# Patient Record
Sex: MALE | Race: WHITE
[De-identification: names, ages, dates, MRNs, and addresses within clinical notes are randomized per-mention and may not be internally consistent; named-entity substitution may affect disease eponyms.]

---

## 2017-03-24 ENCOUNTER — HOSPITAL ENCOUNTER (OUTPATIENT)
Dept: HOSPITAL 62 - RAD | Age: 22
End: 2017-03-24
Attending: SURGERY
Payer: COMMERCIAL

## 2017-03-24 DIAGNOSIS — R11.2: ICD-10-CM

## 2017-03-24 DIAGNOSIS — R10.9: Primary | ICD-10-CM

## 2017-03-24 PROCEDURE — A9537 TC99M MEBROFENIN: HCPCS

## 2017-03-24 PROCEDURE — 78227 HEPATOBIL SYST IMAGE W/DRUG: CPT

## 2019-04-27 ENCOUNTER — HOSPITAL ENCOUNTER (OUTPATIENT)
Dept: HOSPITAL 62 - ER | Age: 24
Setting detail: OBSERVATION
LOS: 1 days | Discharge: HOME | End: 2019-04-28
Attending: SURGERY | Admitting: SURGERY
Payer: COMMERCIAL

## 2019-04-27 DIAGNOSIS — E66.9: ICD-10-CM

## 2019-04-27 DIAGNOSIS — K80.10: Primary | ICD-10-CM

## 2019-04-27 DIAGNOSIS — D13.5: ICD-10-CM

## 2019-04-27 DIAGNOSIS — L29.8: ICD-10-CM

## 2019-04-27 DIAGNOSIS — K76.0: ICD-10-CM

## 2019-04-27 LAB
ADD MANUAL DIFF: NO
ALBUMIN SERPL-MCNC: 4.7 G/DL (ref 3.5–5)
ALP SERPL-CCNC: 64 U/L (ref 38–126)
ALT SERPL-CCNC: 68 U/L (ref 21–72)
ANION GAP SERPL CALC-SCNC: 12 MMOL/L (ref 5–19)
APPEARANCE UR: (no result)
APTT PPP: (no result) S
AST SERPL-CCNC: 26 U/L (ref 17–59)
BASOPHILS # BLD AUTO: 0.1 10^3/UL (ref 0–0.2)
BASOPHILS NFR BLD AUTO: 0.4 % (ref 0–2)
BILIRUB DIRECT SERPL-MCNC: 0.3 MG/DL (ref 0–0.4)
BILIRUB SERPL-MCNC: 0.5 MG/DL (ref 0.2–1.3)
BILIRUB UR QL STRIP: (no result)
BUN SERPL-MCNC: 13 MG/DL (ref 7–20)
CALCIUM: 10.4 MG/DL (ref 8.4–10.2)
CHLORIDE SERPL-SCNC: 104 MMOL/L (ref 98–107)
CO2 SERPL-SCNC: 26 MMOL/L (ref 22–30)
EOSINOPHIL # BLD AUTO: 0.4 10^3/UL (ref 0–0.6)
EOSINOPHIL NFR BLD AUTO: 2.3 % (ref 0–6)
ERYTHROCYTE [DISTWIDTH] IN BLOOD BY AUTOMATED COUNT: 13.4 % (ref 11.5–14)
GLUCOSE SERPL-MCNC: 125 MG/DL (ref 75–110)
GLUCOSE UR STRIP-MCNC: NEGATIVE MG/DL
HCT VFR BLD CALC: 46.1 % (ref 37.9–51)
HGB BLD-MCNC: 15.9 G/DL (ref 13.5–17)
KETONES UR STRIP-MCNC: (no result) MG/DL
LIPASE SERPL-CCNC: 65.2 U/L (ref 23–300)
LYMPHOCYTES # BLD AUTO: 3.3 10^3/UL (ref 0.5–4.7)
LYMPHOCYTES NFR BLD AUTO: 20.8 % (ref 13–45)
MCH RBC QN AUTO: 29.1 PG (ref 27–33.4)
MCHC RBC AUTO-ENTMCNC: 34.4 G/DL (ref 32–36)
MCV RBC AUTO: 85 FL (ref 80–97)
MONOCYTES # BLD AUTO: 0.8 10^3/UL (ref 0.1–1.4)
MONOCYTES NFR BLD AUTO: 5.1 % (ref 3–13)
NEUTROPHILS # BLD AUTO: 11.5 10^3/UL (ref 1.7–8.2)
NEUTS SEG NFR BLD AUTO: 71.4 % (ref 42–78)
NITRITE UR QL STRIP: NEGATIVE
PH UR STRIP: 5 [PH] (ref 5–9)
PLATELET # BLD: 255 10^3/UL (ref 150–450)
POTASSIUM SERPL-SCNC: 4 MMOL/L (ref 3.6–5)
PROT SERPL-MCNC: 7.3 G/DL (ref 6.3–8.2)
PROT UR STRIP-MCNC: 30 MG/DL
RBC # BLD AUTO: 5.45 10^6/UL (ref 4.35–5.55)
SODIUM SERPL-SCNC: 142.4 MMOL/L (ref 137–145)
SP GR UR STRIP: 1.03
TOTAL CELLS COUNTED % (AUTO): 100 %
UROBILINOGEN UR-MCNC: 2 MG/DL (ref ?–2)
WBC # BLD AUTO: 16.1 10^3/UL (ref 4–10.5)

## 2019-04-27 PROCEDURE — 83690 ASSAY OF LIPASE: CPT

## 2019-04-27 PROCEDURE — 85025 COMPLETE CBC W/AUTO DIFF WBC: CPT

## 2019-04-27 PROCEDURE — 96361 HYDRATE IV INFUSION ADD-ON: CPT

## 2019-04-27 PROCEDURE — G0378 HOSPITAL OBSERVATION PER HR: HCPCS

## 2019-04-27 PROCEDURE — 99285 EMERGENCY DEPT VISIT HI MDM: CPT

## 2019-04-27 PROCEDURE — 0FT44ZZ RESECTION OF GALLBLADDER, PERCUTANEOUS ENDOSCOPIC APPROACH: ICD-10-PCS | Performed by: SURGERY

## 2019-04-27 PROCEDURE — 74177 CT ABD & PELVIS W/CONTRAST: CPT

## 2019-04-27 PROCEDURE — 47562 LAPAROSCOPIC CHOLECYSTECTOMY: CPT

## 2019-04-27 PROCEDURE — 96365 THER/PROPH/DIAG IV INF INIT: CPT

## 2019-04-27 PROCEDURE — S0028 INJECTION, FAMOTIDINE, 20 MG: HCPCS

## 2019-04-27 PROCEDURE — 81001 URINALYSIS AUTO W/SCOPE: CPT

## 2019-04-27 PROCEDURE — 36415 COLL VENOUS BLD VENIPUNCTURE: CPT

## 2019-04-27 PROCEDURE — 96366 THER/PROPH/DIAG IV INF ADDON: CPT

## 2019-04-27 PROCEDURE — 96375 TX/PRO/DX INJ NEW DRUG ADDON: CPT

## 2019-04-27 PROCEDURE — 88304 TISSUE EXAM BY PATHOLOGIST: CPT

## 2019-04-27 PROCEDURE — 80053 COMPREHEN METABOLIC PANEL: CPT

## 2019-04-27 PROCEDURE — 76705 ECHO EXAM OF ABDOMEN: CPT

## 2019-04-27 PROCEDURE — 96376 TX/PRO/DX INJ SAME DRUG ADON: CPT

## 2019-04-27 RX ADMIN — OXYCODONE AND ACETAMINOPHEN PRN TAB: 5; 325 TABLET ORAL at 22:48

## 2019-04-27 RX ADMIN — PIPERACILLIN AND TAZOBACTAM SCH MLS/HR: 4; .5 INJECTION, POWDER, LYOPHILIZED, FOR SOLUTION INTRAVENOUS; PARENTERAL at 21:14

## 2019-04-27 RX ADMIN — SODIUM CHLORIDE PRN MLS/HR: 9 INJECTION, SOLUTION INTRAVENOUS at 21:13

## 2019-04-27 RX ADMIN — MORPHINE SULFATE PRN MG: 10 INJECTION INTRAMUSCULAR; INTRAVENOUS; SUBCUTANEOUS at 21:06

## 2019-04-27 NOTE — RADIOLOGY REPORT (SQ)
EXAM DESCRIPTION:

US ABDOMEN LIMITED



COMPLETED DATE/TME:  04/27/2019 04:45



CLINICAL HISTORY:

23 years Male, EPIGASTRIC/RUQ PAIN



Comparison: December 20, 2016. Nuclear medicine, March 24, 2017.



LIMITATIONS: None.



FINDINGS:



Gallbladder sludge, 4 mm gallbladder wall thickness, negative

sonographic Brown's test,  no pericholecystic fluid, mild

hepatic steatosis, a 0.3-cm diameter common bile duct, no

intrahepatic ductal dilation, 11-cm right kidney, obscured

pancreas, visualized vasculature/abdominal aorta, and no

significant ascites appear otherwise unremarkable.



IMPRESSION:



1.  No acute findings.  

2.  Gallbladder sludge. 4 mm gallbladder wall thickness,

nonspecific.

3.  Hepatic steatosis.

4.  Obscured pancreas.

## 2019-04-27 NOTE — OPERATIVE REPORT E
Operative Report



NAME: MANISH ESCAMILLA

MRN:  [id number]          : 1995 AGE:  23Y

DATE OF SURGERY: 2019               ROOM: [id number]



PREOPERATIVE DIAGNOSES:

1.  ACUTE CHOLECYSTITIS.

2.  CHOLELITHIASIS.



POSTOPERATIVE DIAGNOSES:

1.  ACUTE CHOLECYSTITIS.

2.  CHOLELITHIASIS.



PROCEDURE:

Laparoscopic cholecystectomy.



SURGEON:

CHEYANNE LOZANO M.D.



ANESTHESIA:

General.



INDICATION:

This is a 23-year-old male complaining of abdominal pains around 9 this

morning, associated nausea and vomiting.  He went to the ED where an

ultrasound of the abdomen revealed gallstone.  He is markedly tender in

the right upper quadrant with elevated white count.



DESCRIPTION OF PROCEDURE:

After adequate general anesthesia the patient was placed in the supine

position and the abdomen prepped and draped in the usual sterile fashion. 

Appropriate timeout was then called.



Next, infraumbilical incision was made, fascia divided, and Kevin trocar

inserted through the fascia to the abdominal cavity, and CO2 insufflated

to a pressure of 15 mmHg.  Three other trocars were placed under direction

vision at 12 mm in the subxiphoid and two 5 mm in the right upper

quadrant.  The gallbladder was then noted to be markedly distended and

inflamed.  It was then punctured with a long needle and bile aspirated. 

We were now able to grasp the tip of the gallbladder and pull over the

liver.  The infundibulum was then grasped also and the cystic duct

dissected.  There was a lot of fatty tissue around the cystic duct.  This

was dissected with the use of [place] dissector and with Harmonic yuliana.

The cystic duct was then identified as well as the cystic artery.  The

cystic duct noted to be relatively small, it was then clipped with

hemoclips, divided between the distal 2 clips.  The cystic artery was then

clipped with hemoclips and divided within the 2 clips with Harmonic

yuliana.  The gallbladder was then taken off the liver bed with the use of

Harmonic yuliana.  The gallbladder noted to be markedly edematous. 

However, the mid part of the gallbladder appears to be markedly adhered to

the liver and the gallbladder was then partially removed by peeling it off

on pulling the gallbladder cephalad.



Next, prior to removal of the gallbladder from the liver bed, the liver

bed was then irrigated and hemostasis obtained with cautery.  The

gallbladder was then completely removed from the liver bed and placed in

an Endobag and pulled out through the umbilical port.  There appears to be

at least 1 large stone about 1.5 cm in diameter.  Following this the

Kevin trocar was then reinserted and the liver bed again inspected. 

There appears to be no active bleeding but minimal oozing.  Surgicel was

placed over the cystic duct area stump to aid in hemostasis.  A

Miguel-Mckenzie drain was then pulled out through the lateral right upper

quadrant port after placing it through the subxiphoid port.  It was then

laid over the liver bed and anchored to the skin with 2-0 Nylon.



Next, all the trocars were removed and CO2 allowed to come out through the

trocar sites.  The infraumbilical fascial defect was then closed with 2

figure-of-eight sutures using 0-Vicryl.  The stay suture that was placed

earlier was then tied together to have a better closure.



Next, the fascia of this area was then injected with Marcaine.  All the

subcu incision also injected with Marcaine.



Next, all the skin incisions were then closed with running subcuticular

4-0 Vicryl and tied.  Steri-Strips were placed over the operative sites. 

Needle, instrument, and sponge counts were all corrected.  Estimated blood

loss about 30 mL.  The patient brought to the recovery room, extubated, in

satisfactory condition.



DICTATING PHYSICIAN:  CHEYANNE LOZANO M.D.





[id number]                  DT: 2019

PHY#: [id number]            DD: 2019

ID:   [id number]           JOB#: [id number]       ACCT: [id number]



cc:CHEYANNE LOZANO M.D.

>

## 2019-04-27 NOTE — ER DOCUMENT REPORT
ED GI/





- General


Chief Complaint: Abdominal Pain


Stated Complaint: ABD PAIN


Time Seen by Provider: 04/27/19 04:28


TRAVEL OUTSIDE OF THE [place] IN LAST 30 DAYS: No





- HPI


Notes: 





04/27/19 04:35


Patient presents to the emergency department with chief complaint of abdominal 

pain that started around 1 AM this morning.  Patient states having "gallbladder 

issues" in the past and states that the gallbladder attacks have never been this

bad.  Patient has vomited multiple times since developing pain.  He reports that

the pain feels like a burning throughout his whole abdomen worse in the 

epigastric and right upper quadrant.  Patient states the last time he ate 

anything was 6 PM last night.


04/27/19 04:55








- Related Data


Allergies/Adverse Reactions: 


                                        





No Known Allergies Allergy (Unverified 12/31/15 06:42)


   











Past Medical History





- Social History


Smoking Status: Unknown if Ever Smoked


Family History: Reviewed & Not Pertinent





- Immunizations


Hx Diphtheria, Pertussis, Tetanus Vaccination: Yes





Review of Systems





- Review of Systems


Constitutional: See HPI


EENT: No symptoms reported


Cardiovascular: No symptoms reported


Respiratory: No symptoms reported


Gastrointestinal: See HPI


Genitourinary: No symptoms reported


Male Genitourinary: No symptoms reported


Musculoskeletal: No symptoms reported


Skin: No symptoms reported


Hematologic/Lymphatic: No symptoms reported


Neurological/Psychological: No symptoms reported





Physical Exam





- Vital signs


Vitals: 


                                        











Temp Pulse Resp BP Pulse Ox


 


 97.9 F   93   16   147/98 H  98 


 


 04/27/19 04:20  04/27/19 04:20  04/27/19 04:20  04/27/19 04:20  04/27/19 04:20











Interpretation: Hypertensive





- Notes


Notes: 





GENERAL: Ill-appearing, obvious distress. Patient writhing in pain. Unable to 

find position of comfort.


HEAD: Atraumatic, normocephalic.


EYES: Pupils equal round and reactive to light, extraocular movements intact, 

sclera anicteric, conjunctiva are normal.


ENT: TMs normal, nares patent, oropharynx clear without exudates.  Moist mucous 

membranes.


NECK: Normal range of motion, supple without lymphadenopathy or JVD.


LUNGS: Breath sounds clear to auscultation bilaterally and equal.  No wheezes 

rales or rhonchi.


HEART: Regular rate and rhythm without murmurs, rubs or gallops.


ABDOMEN: Soft, tender in the LUQ, epigastrum, RUQ and RLQ, normoactive bowel 

sounds.  + guarding, no rebound.  No masses appreciated.


EXTREMITIES: Normal range of motion, no pitting or edema.  No clubbing or 

cyanosis.


NEUROLOGICAL: Cranial nerves II through XII grossly intact.  Normal speech, 

normal gait.


SKIN: Warm, Dry, normal turgor, no rashes or lesions noted.





Course





- Re-evaluation


Re-evalutation: 





04/27/19 04:35 


Upon initial assessment patient on stretcher writhing in pain.  Unable to find a

position of comfort and unable to sit still.  A brief abdominal assessment was 

performed to find tenderness in the left upper quadrant, epigastric area, right 

upper quadrant and right lower quadrant.  Will order pain medication.  Once 

patient more comfortable will perform a thorough abdominal assessment.





04/27/19 05:00


Patient received a dose of morphine but patient reports that the medication is 

not helping.  IV Dilaudid ordered at this time.  Will continue to closely 

monitor.








04/27/19 05:24


Patient reports that the Dilaudid only helped him for 10 minutes, patient 

continues to arrive in pain and yell.  We will give another dose of Dilaudid and

obtain ultrasound.





04/27/19 0635 


Results of ultrasound pending at this time.  Went to reevaluate patient, patient

resting comfortably on stretcher.  Ata Gonzalez PA-C in room for physical 

assessment as well.  Abdomen soft, active bowel sounds in all 4 quadrants, 

patient is tender in the right lower quadrant as well as the right upper 

quadrant and epigastric area.  Patient states that the majority of his pain is 

in the epigastric area and feels like a burning.  Patient complains of itching 

primarily around his nose.  No hives or rash noted, will administer a small dose

of Benadryl.








04/27/19 07:03


Patient's ultrasound showed gallbladder sludge, there is no pericholecystic 

fluid and no acute findings.  Due to patient's continued pain and abdominal exam

which revealed significant discomfort in the RLQ and RUQ, I believe further 

work-up is needed and a CT has been ordered.





04/27/19 07:44


Spoke with Dr. Bustillos regarding patient's CT results.  Radiologist saw a 

gallstone in the gallbladder neck, cholelithiasis/bladder sludge, a mildly 

tensile gallbladder.  Pt. does have a white count of 16.1 as well as intractable

pain requiring multiple doses of pain medication.  Dr. Bustillos to admit and 

evaluate patient in the emergency department.  Discussed results of CT and 

surgery with patient patient to remain n.p.o.  Patient reports that at this time

his pain level is a 2.5 out of 5.  Will initiate IV maintenance fluids as well 

as a dose of IV Zosyn 4.5 g.


04/27/19 08:00








- Vital Signs


Vital signs: 


                                        











Temp Pulse Resp BP Pulse Ox


 


 97.9 F   93   26 H  147/98 H  95 


 


 04/27/19 04:20  04/27/19 04:20  04/27/19 05:19  04/27/19 04:20  04/27/19 05:19














- Laboratory


Result Diagrams: 


                                 04/27/19 04:33





                                 04/27/19 04:33


Laboratory results interpreted by me: 


                                        











  04/27/19 04/27/19 04/27/19





  04:33 04:33 06:57


 


WBC  16.1 H  


 


Absolute Neutrophils  11.5 H  


 


Glucose   125 H 


 


Calcium   10.4 H 


 


Urine Protein    30 H


 


Urine Ketones    TRACE H


 


Urine Bilirubin    SMALL H


 


Urine Urobilinogen    2.0 H














Discharge





- Discharge


Clinical Impression: 


Cholelithiasis


Qualifiers:


 Cholelithiasis location: gallbladder Cholecystitis presence: with cholecystitis

Cholecystitis acuity: acute Biliary obstruction: without biliary obstruction 

Qualified Code(s): K80.00 - Calculus of gallbladder with acute cholecystitis wit

hout obstruction





Vomiting


Qualifiers:


 Vomiting type: unspecified Vomiting Intractability: non-intractable Nausea 

presence: with nausea Qualified Code(s): R11.2 - Nausea with vomiting, 

unspecified





Abdominal pain


Qualifiers:


 Abdominal location: right upper quadrant Qualified Code(s): R10.11 - Right 

upper quadrant pain





Condition: Stable


Disposition: ADMITTED AS OBSERVATION


Admitting Provider: Dr. Bustillos


Unit Admitted: Surgical Floor

## 2019-04-27 NOTE — RADIOLOGY REPORT (SQ)
EXAM DESCRIPTION:

CT ABDOMEN PELVIS WITH IV CONTRAST



COMPLETED DATE/TME:  04/27/2019 07:02



CLINICAL HISTORY:

23 years Male, RLQ/RUQ QUAD PAIN



Comparison: Ultrasound, concurrent.



Technique:  IV contrast.  Coronal and sagittal reformat.  This

exam was performed according to our departmental

dose-optimization program, which includes automated exposure

control, adjustment of the mA and/or kV according to patient size

and/or use of iterative reconstruction technique.  [facility]: Dose

Right [facility]: CareDose   [facility]: Dose Right    [facility]: Teradose 4D   

[facility]: Smart Technologies



LIMITATIONS:

None



Findings: 



Cholelithiasis/gallbladder sludge.  Mildly tensile gallbladder.

Gallstone at the gallbladder neck.

Grade one L5 anterolisthesis, mild posterior L5 vertebral height

loss, indeterminate age. Chronic bilateral L5 spondylolyses.

No ascites.  Inferior thorax, liver, pancreas, spleen, adrenals,

renal system, gastrointestinal tract, pelvic organs, lymphatics,

vasculature, and musculoskeleton appear otherwise unremarkable.  





IMPRESSION:



1.  Cholelithiasis/gallbladder sludge. Gallstone at the

gallbladder neck which increases risk for biliary colic. Mildly

tensile gallbladder. Differential diagnosis includes

cholecystitis.

2.  Grade one L5 anterolisthesis, mild posterior L5 vertebral

height loss, indeterminate age. Chronic bilateral L5

spondylolyses.

## 2019-04-28 VITALS — DIASTOLIC BLOOD PRESSURE: 98 MMHG | SYSTOLIC BLOOD PRESSURE: 147 MMHG

## 2019-04-28 RX ADMIN — OXYCODONE AND ACETAMINOPHEN PRN TAB: 5; 325 TABLET ORAL at 12:54

## 2019-04-28 RX ADMIN — PIPERACILLIN AND TAZOBACTAM SCH MLS/HR: 4; .5 INJECTION, POWDER, LYOPHILIZED, FOR SOLUTION INTRAVENOUS; PARENTERAL at 02:41

## 2019-04-28 RX ADMIN — SODIUM CHLORIDE PRN MLS/HR: 9 INJECTION, SOLUTION INTRAVENOUS at 05:20

## 2019-04-28 RX ADMIN — MORPHINE SULFATE PRN MG: 10 INJECTION INTRAMUSCULAR; INTRAVENOUS; SUBCUTANEOUS at 09:36

## 2019-04-28 RX ADMIN — OXYCODONE AND ACETAMINOPHEN PRN TAB: 5; 325 TABLET ORAL at 06:50

## 2019-04-28 RX ADMIN — PIPERACILLIN AND TAZOBACTAM SCH MLS/HR: 4; .5 INJECTION, POWDER, LYOPHILIZED, FOR SOLUTION INTRAVENOUS; PARENTERAL at 09:35

## 2019-04-28 RX ADMIN — PIPERACILLIN AND TAZOBACTAM SCH MLS/HR: 4; .5 INJECTION, POWDER, LYOPHILIZED, FOR SOLUTION INTRAVENOUS; PARENTERAL at 14:42

## 2019-04-28 RX ADMIN — MORPHINE SULFATE PRN MG: 10 INJECTION INTRAMUSCULAR; INTRAVENOUS; SUBCUTANEOUS at 05:20

## 2019-04-28 RX ADMIN — MORPHINE SULFATE PRN MG: 10 INJECTION INTRAMUSCULAR; INTRAVENOUS; SUBCUTANEOUS at 01:05

## 2019-04-29 NOTE — DISCHARGE SUMMARY E
Discharge Summary



NAME: MANISH ESCAMILLA

MRN:  [id number]        : 1995     AGE: 23Y

ADMITTED: 2019                  DISCHARGED: 2019



FINAL DIAGNOSES:

1.  Acute cholecystitis.

2.  Cholelithiasis.



PROCEDURE DONE:

On 2019 laparoscopic cholecystectomy.



HOSPITAL COURSE:

This is a 23-year-old male complaining of severe right upper quadrant

pains early morning on the day of admission.  He required several doses of

narcotics in the ED and elevated white count.  He was then brought to the

OR on the same day and laparoscopic cholecystectomy was performed for an

acute cholecystitis and cholelithiasis.  Postoperatively the patient did

very well and discharged improved, tolerating regular diet on 2019

with the above final diagnoses.



DISCHARGE INSTRUCTIONS:

The patient was given a prescription for Percocet 5/325 mg 1 every 6 hours

as needed for about 10 doses.  The patient to be followed up in the

surgical clinic in 2 weeks.  The patient advised not to do heavy lifting

for the next 2 weeks, no more than 15 pounds.





DICTATING PHYSICIAN:  CHEYANNE LOZANO M.D.





[id number]                  DT: [id number]

PHY#: [id number]            DD: 2019

ID:   [id number]           JOB#: [id number]       ACCT: [id number]



cc:CHEYANNE LOZANO M.D.

>